# Patient Record
Sex: MALE | Race: WHITE
[De-identification: names, ages, dates, MRNs, and addresses within clinical notes are randomized per-mention and may not be internally consistent; named-entity substitution may affect disease eponyms.]

---

## 2019-11-26 ENCOUNTER — HOSPITAL ENCOUNTER (EMERGENCY)
Dept: HOSPITAL 97 - ER | Age: 79
Discharge: HOME | End: 2019-11-26
Payer: COMMERCIAL

## 2019-11-26 VITALS — OXYGEN SATURATION: 100 % | TEMPERATURE: 98 F

## 2019-11-26 VITALS — SYSTOLIC BLOOD PRESSURE: 128 MMHG | DIASTOLIC BLOOD PRESSURE: 63 MMHG
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
## 2019-11-26 DIAGNOSIS — Z23: ICD-10-CM

## 2019-11-26 DIAGNOSIS — S61.411A: Primary | ICD-10-CM
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
## 2019-11-26 DIAGNOSIS — I10: ICD-10-CM

## 2019-11-26 PROCEDURE — 0JQJ0ZZ REPAIR RIGHT HAND SUBCUTANEOUS TISSUE AND FASCIA, OPEN APPROACH: ICD-10-PCS

## 2019-11-26 PROCEDURE — 90471 IMMUNIZATION ADMIN: CPT

## 2019-11-26 PROCEDURE — 99283 EMERGENCY DEPT VISIT LOW MDM: CPT

## 2019-11-26 PROCEDURE — 90714 TD VACC NO PRESV 7 YRS+ IM: CPT

## 2019-11-26 NOTE — ER
Nurse's Notes                                                                                     

 [facility]                                                                 

Name: True Escobar                                                                                  

Age: 79 yrs                                                                                       

Sex: Male                                                                                         

: 1940                                                                                   

MRN: [id number]                                                                                   

Arrival Date: 2019                                                                          

Time: 17:40                                                                                       

Account#: [id number]                                                                            

Bed 15                                                                                            

Private MD: Blayne Hurtado V                                                                      

Diagnosis: Laceration without foreign body of right hand                                          

                                                                                                  

Presentation:                                                                                     

                                                                                             

17:42 Presenting complaint: Patient states: I tripped and fell and cut the base of my right   la1 

      pinky on the ground, also hurt the tip of my left pinky. Denies LOC. Transition of          

      care: patient was not received from another setting of care. Onset of symptoms was          

      2019. Risk Assessment: Do you want to hurt yourself or someone else?           

      Patient reports no desire to harm self or others. Initial Sepsis Screen: Does the           

      patient meet any 2 criteria? No. Patient's initial sepsis screen is negative. Does the      

      patient have a suspected source of infection? No. Patient's initial sepsis screen is        

      negative. Care prior to arrival: None.                                                      

17:42 Method Of Arrival: Ambulatory                                                           la1 

17:42 Acuity: MAGALY 4                                                                           la1 

                                                                                                  

Triage Assessment:                                                                                

17:50 Pain: Denies pain.                                                                      rb1 

                                                                                                  

Historical:                                                                                       

- Allergies:                                                                                      

17:44 No Known Allergies;                                                                     la1 

- PMHx:                                                                                           

17:44 Hyperlipidemia; Hypertension; Myocardial infarction;                                    la1 

                                                                                                  

- Immunization history:: Adult Immunizations up to date.                                          

- Social history:: Smoking status: Patient/guardian denies using tobacco.                         

- Ebola Screening: : No symptoms or risks identified at this time.                                

                                                                                                  

                                                                                                  

Screenin:50 Abuse screen: Denies threats or abuse. Nutritional screening: No deficits noted.        rb1 

      Tuberculosis screening: No symptoms or risk factors identified. Fall Risk Fall in past      

      12 months (25 points). No secondary diagnosis (0 pts). No IV (0 pts). Ambulatory Aid-       

      None/Bed Rest/Nurse Assist (0 pts). Gait- Normal/Bed Rest/Wheelchair (0 pts) Mental         

      Status- Oriented to own ability (0 pts). Total Blackburn Fall Scale indicates Low Risk          

      Score (25-44 pts). Fall prevention measures have been instituted. Side Rails Up X 2         

      Placed close to Nursing Station 1:1 attendant Assigned to Pt. Frequent Obs/Assesments       

      occuring Family Present and informed to notify staff if they need to leave bedside As       

      available Patient and Family Educated on Fall Prevention Program and strategies.            

                                                                                                  

Assessment:                                                                                       

17:50 General: Appears in no apparent distress. comfortable, Behavior is calm, cooperative.   rb1 

      Neuro: Level of Consciousness is awake, alert, obeys commands, Oriented to person,          

      place, time, situation. Cardiovascular: Capillary refill < 3 seconds is brisk in            

      bilateral fingers. Respiratory: Airway is patent Respiratory effort is even, unlabored,     

      Respiratory pattern is regular, symmetrical. GI: No signs and/or symptoms were reported     

      involving the gastrointestinal system. : No signs and/or symptoms were reported           

      regarding the genitourinary system. Derm: Skin is pink, warm \T\ dry. Injury Description:   

      Laceration sustained to outer aspect of right palm is contaminated, not bleeding, no        

      active bleeding noted at this time.                                                         

18:24 Reassessment: Discharge pending, waiting for suture placement by Dr. Al.          rb1 

18:28 Reassessment: Patient appears in no apparent distress at this time. No changes from     rb1 

      previously documented assessment.                                                           

                                                                                                  

Vital Signs:                                                                                      

17:44 Resp 16; Temp 98.0; Pulse Ox 100% on R/A; Weight 90.26 kg; Height 5 ft. 8 in. (172.72   la1 

      cm);                                                                                        

17:45  / 61; Pulse 64;                                                                  la1 

18:43  / 63; Pulse 66; Resp 17; Pulse Ox 100% on R/A; Pain 0/10;                        rb1 

17:44 Body Mass Index 30.26 (90.26 kg, 172.72 cm)                                             la1 

                                                                                                  

ED Course:                                                                                        

17:40 Patient arrived in ED.                                                                  mr  

17:41 Blayne Hurtado MD is Private Physician.                                                 mr  

17:43 Triage completed.                                                                       la1 

17:44 Arm band placed on right wrist.                                                         la1 

17:49 Pilar Prieto, RN is Primary Nurse.                                                   rb1 

17:50 Patient has correct armband on for positive identification. Bed in low position. Call   rb1 

      light in reach. Side rails up X 1. Pulse ox on. NIBP on.                                    

17:55 Ford Al MD is Attending Physician.                                             isaiah 

18:16 Blayne Hurtado MD is Referral Physician.                                                isaiah 

19:10 No provider procedures requiring assistance completed. Patient did not have IV access   rb1 

      during this emergency room visit.                                                           

                                                                                                  

Administered Medications:                                                                         

18:30 Drug: Lidocaine-Epinephrine -1%: (1:100,000) 5 ml Volume: 20 ml; Route: Infiltration;   rb1 

18:59 Drug: Tetanus-Diphtheria Toxoid Adult 0.5 ml {: [employer] Exp:         rb1 

      2021. Lot #: [id number]. } Route: IM; Site: right deltoid;                                 

19:08 Follow up: Response: No adverse reaction                                                rb1 

18:59 Drug: KeFLEX 500 mg Route: PO;                                                          rb1 

19:08 Follow up: Response: Medication administered at discharge.                              rb1 

                                                                                                  

                                                                                                  

Outcome:                                                                                          

18:18 Discharge ordered by MD.                                                                isaiah 

19:10 Discharged to home ambulatory, with family.                                             rb1 

19:10 Condition: stable                                                                           

19:10 Discharge instructions given to patient, Instructed on discharge instructions, follow       

      up and referral plans. medication usage, Demonstrated understanding of instructions,        

      follow-up care, medications, Prescriptions given X 2.                                       

19:10 Patient left the ED.                                                                    rb1 

                                                                                                  

Signatures:                                                                                       

Ford Al MD MD cha Rivera, Mary mr Attema, Dell, RN                         RN   la1                                                  

Pilar Prieto, RN                     RN   rb1                                                  

                                                                                                  

**************************************************************************************************

## 2019-11-26 NOTE — EDPHYS
Physician Documentation                                                                           

 [facility]                                                                 

Name: True Escobar                                                                                  

Age: 79 yrs                                                                                       

Sex: Male                                                                                         

: 1940                                                                                   

MRN: [id number]                                                                                   

Arrival Date: 2019                                                                          

Time: 17:40                                                                                       

Account#: [id number]                                                                            

Bed 15                                                                                            

Private MD: Blayne Hurtado V                                                                      

ED Physician Ford Al                                                                      

HPI:                                                                                              

                                                                                             

18:12 This 79 yrs old  Male presents to ER via Ambulatory with complaints of Fall    isaiah 

      Injury, Laceration To Hand.                                                                 

18:12 Details of fall: The patient fell from an upright position, while walking. Onset: The   isaiah 

      symptoms/episode began/occurred just prior to arrival. Associated injuries: The patient     

      sustained outer aspect of right palm, laceration, 2.5 cm(s). Severity of symptoms: At       

      their worst the symptoms were mild, in the emergency department the symptoms are            

      unchanged. The patient has not experienced similar symptoms in the past.                    

                                                                                                  

Historical:                                                                                       

- Allergies:                                                                                      

17:44 No Known Allergies;                                                                     la1 

- PMHx:                                                                                           

17:44 Hyperlipidemia; Hypertension; Myocardial infarction;                                    la1 

                                                                                                  

- Immunization history:: Adult Immunizations up to date.                                          

- Social history:: Smoking status: Patient/guardian denies using tobacco.                         

- Ebola Screening: : No symptoms or risks identified at this time.                                

                                                                                                  

                                                                                                  

ROS:                                                                                              

18:13 Constitutional: Negative for fever, chills, and weight loss, Eyes: Negative for injury, isaiah 

      pain, redness, and discharge, ENT: Negative for injury, pain, and discharge, Neck:          

      Negative for injury, pain, and swelling, Cardiovascular: Negative for chest pain,           

      palpitations, and edema, Respiratory: Negative for shortness of breath, cough,              

      wheezing, and pleuritic chest pain, Abdomen/GI: Negative for abdominal pain, nausea,        

      vomiting, diarrhea, and constipation, Back: Negative for injury and pain, : Negative      

      for injury, bleeding, discharge, and swelling, Skin: Negative for injury, rash, and         

      discoloration, Neuro: Negative for headache, weakness, numbness, tingling, and seizure,     

      Psych: Negative for depression, anxiety, suicide ideation, homicidal ideation, and          

      hallucinations, Allergy/Immunology: Negative for hives, rash, and allergies, Endocrine:     

      Negative for neck swelling, polydipsia, polyuria, polyphagia, and marked weight             

      changes, Hematologic/Lymphatic: Negative for swollen nodes, abnormal bleeding, and          

      unusual bruising.                                                                           

18:13 MS/extremity: Positive for decreased range of motion, laceration, pain, of the outer        

      aspect of right palm.                                                                       

                                                                                                  

Exam:                                                                                             

18:13 Constitutional:  This is a well developed, well nourished patient who is awake, alert,  isaiah 

      and in no acute distress. Head/Face:  Normocephalic, atraumatic. Eyes:  Pupils equal        

      round and reactive to light, extra-ocular motions intact.  Lids and lashes normal.          

      Conjunctiva and sclera are non-icteric and not injected.  Cornea within normal limits.      

      Periorbital areas with no swelling, redness, or edema. ENT:  Nares patent. No nasal         

      discharge, no septal abnormalities noted.  Tympanic membranes are normal and external       

      auditory canals are clear.  Oropharynx with no redness, swelling, or masses, exudates,      

      or evidence of obstruction, uvula midline.  Mucous membranes moist. Neck:  Trachea          

      midline, no thyromegaly or masses palpated, and no cervical lymphadenopathy.  Supple,       

      full range of motion without nuchal rigidity, or vertebral point tenderness.  No            

      Meningismus. Chest/axilla:  Normal chest wall appearance and motion.  Nontender with no     

      deformity.  No lesions are appreciated. Cardiovascular:  Regular rate and rhythm with a     

      normal S1 and S2.  No gallops, murmurs, or rubs.  Normal PMI, no JVD.  No pulse             

      deficits. Respiratory:  Lungs have equal breath sounds bilaterally, clear to                

      auscultation and percussion.  No rales, rhonchi or wheezes noted.  No increased work of     

      breathing, no retractions or nasal flaring. Abdomen/GI:  Soft, non-tender, with normal      

      bowel sounds.  No distension or tympany.  No guarding or rebound.  No evidence of           

      tenderness throughout. Back:  No spinal tenderness.  No costovertebral tenderness.          

      Full range of motion. Skin:  Warm, dry with normal turgor.  Normal color with no            

      rashes, no lesions, and no evidence of cellulitis. Neuro:  Awake and alert, GCS 15,         

      oriented to person, place, time, and situation.  Cranial nerves II-XII grossly intact.      

      Motor strength 5/5 in all extremities.  Sensory grossly intact.  Cerebellar exam            

      normal.  Normal gait. Psych:  Awake, alert, with orientation to person, place and time.     

       Behavior, mood, and affect are within normal limits.                                       

18:13 Musculoskeletal/extremity: Extremities: laceration, pain.                                   

                                                                                                  

Vital Signs:                                                                                      

17:44 Resp 16; Temp 98.0; Pulse Ox 100% on R/A; Weight 90.26 kg; Height 5 ft. 8 in. (172.72   la1 

      cm);                                                                                        

17:45  / 61; Pulse 64;                                                                  la1 

18:43  / 63; Pulse 66; Resp 17; Pulse Ox 100% on R/A; Pain 0/10;                        rb1 

17:44 Body Mass Index 30.26 (90.26 kg, 172.72 cm)                                             la1 

                                                                                                  

Laceration:                                                                                       

18:15 Wound Repair of 2.5cm ( 1.0in ) subcutaneous laceration to outer aspect of right palm.  isaiah 

      Irregularly shaped.. Skin/tissue flap noted.. Distal neuro/vascular/tendon intact.          

      Anesthesia: Local anesthetic administered with 5 mls of 1% lidocaine w/ Epi. Wound          

      prep: Moderate cleansing by me, Copious irrigation. Skin closed with 4 5-0 Prolene          

      using interrupted sutures and sterile technique. Dressed with Neosporin, non-adherent       

      dressing. Patient tolerated well.                                                           

                                                                                                  

MDM:                                                                                              

17:55 Patient medically screened.                                                             [facility] 

18:13 Data reviewed: vital signs, nurses notes.                                               [facility] 

                                                                                                  

                                                                                             

18:12 Order name: Prolene, Sutures; Complete Time: 18:45                                      [facility] 

                                                                                             

18:12 Order name: Dressing - Wound; Complete Time: 18:45                                      [facility] 

                                                                                             

18:12 Order name: Gloves, Sterile; Complete Time: 18:22                                       [facility] 

                                                                                             

18:12 Order name: Setup Suture Tray; Complete Time: 18:22                                     [facility] 

                                                                                                  

Administered Medications:                                                                         

18:30 Drug: Lidocaine-Epinephrine -1%: (1:100,000) 5 ml Volume: 20 ml; Route: Infiltration;   rb1 

18:59 Drug: Tetanus-Diphtheria Toxoid Adult 0.5 ml {: [employer]. Exp:         rb1 

      2021. Lot #: [id number]. } Route: IM; Site: right deltoid;                                 

19:08 Follow up: Response: No adverse reaction                                                rb1 

18:59 Drug: KeFLEX 500 mg Route: PO;                                                          rb1 

19:08 Follow up: Response: Medication administered at discharge.                              rb1 

                                                                                                  

                                                                                                  

Disposition:                                                                                      

19 18:18 Discharged to Home. Impression: Laceration without foreign body of right hand.     

- Condition is Stable.                                                                            

- Discharge Instructions: Laceration Care, Adult, Laceration Care, Adult, Easy-to-Read.           

- Prescriptions for Keflex 500 mg Oral Capsule - take 1 capsule by ORAL route every 6             

  hours for 10 days; 40 capsule. Tylenol- Codeine #3 300-30 mg Oral Tablet - take 2               

  tablets by ORAL route every 6 hours As needed; 20 tablet.                                       

- Medication Reconciliation Form, Thank You Letter, Antibiotic Education, Prescription            

  Opioid Use form.                                                                                

- Follow up: Blayne Hurtdao MD; When: 7 - 10 days; Reason: Recheck today's complaints,            

  Continuance of care, Re-evaluation by your physician.                                           

- Problem is new.                                                                                 

- Symptoms have improved.                                                                         

                                                                                                  

                                                                                                  

                                                                                                  

Signatures:                                                                                       

Dispatcher [employer]                           EDMS                                                 

Ford Al MD MD cha Attema, Lee RN                         RN   la1                                                  

Pilar Prieto, RN                     RN   rb1                                                  

                                                                                                  

Corrections: (The following items were deleted from the chart)                                    

18:46 18:19 Hand Right 3 View+RAD.RAD.BRZ ordered. [facility]

19:10 18:18 2019 18:18 Discharged to Home. Impression: Laceration without foreign body  rb1 

      of right hand. Condition is Stable. Forms are Medication Reconciliation Form, Thank You     

      Letter, Antibiotic Education, Prescription Opioid Use. Follow up: Blayne Hurtado; When: 7     

      - 10 days; Reason: Recheck today's complaints, Continuance of care, Re-evaluation by        

      your physician. Problem is new. Symptoms have improved. isaiah                                 

                                                                                                  

**************************************************************************************************

## 2023-02-09 ENCOUNTER — HOSPITAL ENCOUNTER (OUTPATIENT)
Dept: HOSPITAL 97 - 4TH | Age: 83
Setting detail: OBSERVATION
LOS: 2 days | Discharge: HOME | End: 2023-02-11
Attending: INTERNAL MEDICINE | Admitting: INTERNAL MEDICINE
Payer: COMMERCIAL

## 2023-02-09 VITALS — BODY MASS INDEX: 31.6 KG/M2

## 2023-02-09 DIAGNOSIS — G30.9: ICD-10-CM

## 2023-02-09 DIAGNOSIS — F02.80: ICD-10-CM

## 2023-02-09 DIAGNOSIS — I25.10: ICD-10-CM

## 2023-02-09 DIAGNOSIS — E11.9: ICD-10-CM

## 2023-02-09 DIAGNOSIS — I50.9: ICD-10-CM

## 2023-02-09 DIAGNOSIS — Z95.5: ICD-10-CM

## 2023-02-09 DIAGNOSIS — R42: ICD-10-CM

## 2023-02-09 DIAGNOSIS — R27.0: Primary | ICD-10-CM

## 2023-02-09 DIAGNOSIS — U07.1: ICD-10-CM

## 2023-02-09 DIAGNOSIS — E86.0: ICD-10-CM

## 2023-02-09 DIAGNOSIS — G43.909: ICD-10-CM

## 2023-02-09 DIAGNOSIS — G31.9: ICD-10-CM

## 2023-02-09 DIAGNOSIS — I10: ICD-10-CM

## 2023-02-09 LAB
ALBUMIN SERPL BCP-MCNC: 4.2 G/DL (ref 3.4–5)
ALP SERPL-CCNC: 79 U/L (ref 45–117)
ALT SERPL W P-5'-P-CCNC: 100 U/L (ref 16–61)
AST SERPL W P-5'-P-CCNC: 95 U/L (ref 15–37)
BUN BLD-MCNC: 23 MG/DL (ref 7–18)
CREAT UR-SCNC: 226 MG/DL (ref 20–370)
GLUCOSE SERPLBLD-MCNC: 118 MG/DL (ref 74–106)
HCT VFR BLD CALC: 50.9 % (ref 39.6–49)
INR BLD: 1.19
LYMPHOCYTES # SPEC AUTO: 0.5 K/UL (ref 0.7–4.9)
MAGNESIUM SERPL-MCNC: 2.4 MG/DL (ref 1.6–2.4)
MCV RBC: 88.9 FL (ref 80–100)
MICROALBUMIN UR-MCNC: 63 MG/DL (ref ?–1.9)
PMV BLD: 8 FL (ref 7.6–11.3)
POTASSIUM SERPL-SCNC: 4 MMOL/L (ref 3.5–5.1)
RBC # BLD: 5.72 M/UL (ref 4.33–5.43)
TSH SERPL DL<=0.05 MIU/L-ACNC: 0.92 UIU/ML (ref 0.36–3.74)

## 2023-02-09 PROCEDURE — 97161 PT EVAL LOW COMPLEX 20 MIN: CPT

## 2023-02-09 PROCEDURE — 82043 UR ALBUMIN QUANTITATIVE: CPT

## 2023-02-09 PROCEDURE — 84443 ASSAY THYROID STIM HORMONE: CPT

## 2023-02-09 PROCEDURE — 80048 BASIC METABOLIC PNL TOTAL CA: CPT

## 2023-02-09 PROCEDURE — 85730 THROMBOPLASTIN TIME PARTIAL: CPT

## 2023-02-09 PROCEDURE — 93005 ELECTROCARDIOGRAM TRACING: CPT

## 2023-02-09 PROCEDURE — 70551 MRI BRAIN STEM W/O DYE: CPT

## 2023-02-09 PROCEDURE — 80076 HEPATIC FUNCTION PANEL: CPT

## 2023-02-09 PROCEDURE — 81001 URINALYSIS AUTO W/SCOPE: CPT

## 2023-02-09 PROCEDURE — 71046 X-RAY EXAM CHEST 2 VIEWS: CPT

## 2023-02-09 PROCEDURE — 82306 VITAMIN D 25 HYDROXY: CPT

## 2023-02-09 PROCEDURE — 97116 GAIT TRAINING THERAPY: CPT

## 2023-02-09 PROCEDURE — 85610 PROTHROMBIN TIME: CPT

## 2023-02-09 PROCEDURE — 83036 HEMOGLOBIN GLYCOSYLATED A1C: CPT

## 2023-02-09 PROCEDURE — 94640 AIRWAY INHALATION TREATMENT: CPT

## 2023-02-09 PROCEDURE — 36415 COLL VENOUS BLD VENIPUNCTURE: CPT

## 2023-02-09 PROCEDURE — 82947 ASSAY GLUCOSE BLOOD QUANT: CPT

## 2023-02-09 PROCEDURE — 84100 ASSAY OF PHOSPHORUS: CPT

## 2023-02-09 PROCEDURE — 87811 SARS-COV-2 COVID19 W/OPTIC: CPT

## 2023-02-09 PROCEDURE — 82607 VITAMIN B-12: CPT

## 2023-02-09 PROCEDURE — 97530 THERAPEUTIC ACTIVITIES: CPT

## 2023-02-09 PROCEDURE — 83735 ASSAY OF MAGNESIUM: CPT

## 2023-02-09 PROCEDURE — 82570 ASSAY OF URINE CREATININE: CPT

## 2023-02-09 PROCEDURE — 85025 COMPLETE CBC W/AUTO DIFF WBC: CPT

## 2023-02-09 RX ADMIN — ISOSORBIDE MONONITRATE SCH MG: 30 TABLET, EXTENDED RELEASE ORAL at 20:27

## 2023-02-09 RX ADMIN — NIRMATRELVIR AND RITONAVIR SCH: KIT at 14:21

## 2023-02-09 RX ADMIN — SODIUM CHLORIDE SCH MLS: 0.45 INJECTION, SOLUTION INTRAVENOUS at 14:14

## 2023-02-09 RX ADMIN — ISOSORBIDE MONONITRATE SCH MG: 30 TABLET, EXTENDED RELEASE ORAL at 14:14

## 2023-02-09 RX ADMIN — Medication SCH: at 16:57

## 2023-02-09 RX ADMIN — IPRATROPIUM BROMIDE SCH MG: 0.5 SOLUTION RESPIRATORY (INHALATION) at 15:53

## 2023-02-09 RX ADMIN — HUMAN INSULIN SCH: 100 INJECTION, SOLUTION SUBCUTANEOUS at 16:30

## 2023-02-09 RX ADMIN — ALBUTEROL SULFATE SCH MG: 2.5 SOLUTION RESPIRATORY (INHALATION) at 20:15

## 2023-02-09 RX ADMIN — ALBUTEROL SULFATE SCH MG: 2.5 SOLUTION RESPIRATORY (INHALATION) at 15:53

## 2023-02-09 RX ADMIN — HUMAN INSULIN SCH: 100 INJECTION, SOLUTION SUBCUTANEOUS at 20:28

## 2023-02-09 RX ADMIN — NIRMATRELVIR AND RITONAVIR SCH: KIT at 20:27

## 2023-02-09 RX ADMIN — IPRATROPIUM BROMIDE SCH MG: 0.5 SOLUTION RESPIRATORY (INHALATION) at 20:15

## 2023-02-09 NOTE — RAD REPORT
EXAM DESCRIPTION:  MRI - Brain Wo Cont - 2/9/2023 6:04 pm

 

CLINICAL HISTORY:  possible NPH

Headache, drowsiness

 

COMPARISON:  HEAD BRAIN W O CONTRAST dated 3/23/2016

 

TECHNIQUE:  Multi-sequence, multiplanar MR imaging of the brain was performed without contrast.

 

FINDINGS:  No intracranial hemorrhage, hydrocephalus or extra-axial fluid collections.Moderate genera
lized brain atrophy. Mild to moderate T2 and FLAIR hyperintensity in the periventricular and deep whi
te matter likely represents chronic microvascular ischemic changes. No edema or shift of midline stru
ctures. No findings to suspect brain mass. DWI is negative for acute CVA.

 

Midline structures are normally formed.

 

Mastoid air cells and paranasal sinuses are clear. Calcified right globe.

 

IMPRESSION:  Negative for acute CVA or other acute intracranial process.

 

Ventricular prominence is in proportion to the degree of global brain atrophy.

## 2023-02-09 NOTE — RAD REPORT
EXAM DESCRIPTION:  RAD - Chest Pa And Lat (2 Views) - 2/9/2023 5:37 pm

 

CLINICAL HISTORY:  covid positive

Chest pain.

 

COMPARISON:  CHEST SINGLE VIEW dated 3/23/2016; CHEST PA AND LAT 2 VIEW dated 12/27/2012; CHEST SINGL
E VIEW dated 3/2/2011; CHEST PA AND LAT 2 VIEW dated 9/1/2010

 

FINDINGS:  Mild interstitial pulmonary edema suspected. The heart is mildly enlarged in size. No disp
laced fractures.

 

IMPRESSION:  Mild CHF.

## 2023-02-10 RX ADMIN — HUMAN INSULIN SCH: 100 INJECTION, SOLUTION SUBCUTANEOUS at 20:25

## 2023-02-10 RX ADMIN — MEMANTINE HYDROCHLORIDE SCH MG: 10 TABLET ORAL at 20:17

## 2023-02-10 RX ADMIN — IPRATROPIUM BROMIDE SCH: 0.5 SOLUTION RESPIRATORY (INHALATION) at 14:00

## 2023-02-10 RX ADMIN — ISOSORBIDE MONONITRATE SCH MG: 30 TABLET, EXTENDED RELEASE ORAL at 20:17

## 2023-02-10 RX ADMIN — ISOSORBIDE MONONITRATE SCH MG: 30 TABLET, EXTENDED RELEASE ORAL at 07:49

## 2023-02-10 RX ADMIN — NIRMATRELVIR AND RITONAVIR SCH: KIT at 20:20

## 2023-02-10 RX ADMIN — ASPIRIN 81 MG SCH MG: 81 TABLET ORAL at 07:49

## 2023-02-10 RX ADMIN — IPRATROPIUM BROMIDE SCH MG: 0.5 SOLUTION RESPIRATORY (INHALATION) at 19:35

## 2023-02-10 RX ADMIN — HUMAN INSULIN SCH: 100 INJECTION, SOLUTION SUBCUTANEOUS at 16:30

## 2023-02-10 RX ADMIN — Medication SCH ML: at 07:53

## 2023-02-10 RX ADMIN — Medication SCH: at 01:00

## 2023-02-10 RX ADMIN — HUMAN INSULIN SCH: 100 INJECTION, SOLUTION SUBCUTANEOUS at 11:30

## 2023-02-10 RX ADMIN — ASPIRIN 81 MG SCH MG: 81 TABLET ORAL at 20:17

## 2023-02-10 RX ADMIN — Medication SCH: at 16:45

## 2023-02-10 RX ADMIN — ALBUTEROL SULFATE SCH MG: 2.5 SOLUTION RESPIRATORY (INHALATION) at 01:30

## 2023-02-10 RX ADMIN — HUMAN INSULIN SCH: 100 INJECTION, SOLUTION SUBCUTANEOUS at 07:30

## 2023-02-10 RX ADMIN — IPRATROPIUM BROMIDE SCH MG: 0.5 SOLUTION RESPIRATORY (INHALATION) at 08:38

## 2023-02-10 RX ADMIN — ALBUTEROL SULFATE SCH: 2.5 SOLUTION RESPIRATORY (INHALATION) at 14:00

## 2023-02-10 RX ADMIN — ALBUTEROL SULFATE SCH MG: 2.5 SOLUTION RESPIRATORY (INHALATION) at 19:35

## 2023-02-10 RX ADMIN — CLOPIDOGREL BISULFATE SCH MG: 75 TABLET, FILM COATED ORAL at 07:50

## 2023-02-10 RX ADMIN — NIRMATRELVIR AND RITONAVIR SCH: KIT at 10:08

## 2023-02-10 RX ADMIN — ALBUTEROL SULFATE SCH MG: 2.5 SOLUTION RESPIRATORY (INHALATION) at 08:38

## 2023-02-10 RX ADMIN — MEMANTINE HYDROCHLORIDE SCH MG: 10 TABLET ORAL at 07:50

## 2023-02-10 RX ADMIN — IPRATROPIUM BROMIDE SCH MG: 0.5 SOLUTION RESPIRATORY (INHALATION) at 01:30

## 2023-02-10 RX ADMIN — SODIUM CHLORIDE SCH: 0.45 INJECTION, SOLUTION INTRAVENOUS at 23:20

## 2023-02-10 NOTE — CON
Reason For Consultation:  Consultation called because of the possibility of NPH.



History Of Present Illness:  Mr. Escobar is an 82-year-old, right-handed  patient who was seen
 in clinic for Alzheimer disease.  He has taken the Exelon patch and memantine for 1 year and has mil
d cognitive impairment.  Shannon cognitive Assessment as totaled a year ago was 24/30.  His most rec
ent clock draw test was 2.5/4 and was in October 17, 2022.  The patient notes he started feeling dizz
y.  He said the world seemed to be swirling about him.  Could not keep his balance, started to feel u
nsteady.  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
e hydrocephalus.  He was admitted to [facility] for workup.  While at [facility], h
is workup revealed he was positive for the COVID-19 infection, and brain MRI identified mild-to-moder
ate global atrophy with ventricular size increase in proportion.  There was no evidence of hydrocepha
rony, acute stroke, hemorrhage, or mass or other abnormalities.  His blood work otherwise showed a nor
mal white blood cell count with lymphocytes low at 8.2, hemoglobin 17, INR 1.19.  Chemistries show el
evated creatinine of 1.37, glucose 118.  A1c 6.2.  Liver function studies elevated, AST 95, , 
alkaline phosphatase normal at 79.  Vitamin D level is low at 21.7.  Urinalysis shows 63 random urine
 microalbumin with the microalbumin to creatinine ratio of 278.8 which is elevated.  Urine protein is
 elevated at 2.0.  His COVID-19 test is positive.  His chest x-ray shows mild interstitial pulmonary 
edema with mild CHF.  While hospitalized, the patient has had a T-max of 100.2.  He has received intr
avenous hydration.  He is put back on Crestor for dyslipidemia, has memantine for Alzheimer disease, 
Plavix 75 mg and aspirin, he did receive 162 mg.  he is receiving nebulizer treatment.  He has not ye
t started on Paxlovid.



Past Medical History:  Migraines, hypertension, diabetes mellitus, chronic heart disease, coronary ar
natalia disease.



Past Surgical History:  He has had 5 cardiac stents placed by Dr. Ranjit Houser, at [facility].



Family History:  Both parents have Alzheimer disease.  Sister also has Alzheimer disease.  Son had a 
myocardial infarction.



Social History:  Patient is .  Taking care of his sick wife.  No alcohol, tobacco, or IV drug 
use.



Medications:  Carvedilol 6.25 mg in the morning, Exelon patch 13.3 mg every 24 hours, rosuvastatin 20
 mg daily, isosorbide extended release 30 mg morning.  Niaspan 500 mg 2 in the morning, 2 in the even
ing.  Aspirin 81 mg tablets 2 in the morning and 2 in the evening.  Plavix 75 mg daily.  Curcumin 400
 mg at noon.  Mitochondrial energy booster 1 in the morning, 1 in the evening.



Allergies:  GABAPENTIN AND TRAMADOL.



Physical Examination:

Vital Signs:  Blood pressure 160/63, pulse 75, respiratory rate 16, T-max is noted 100.2, current tem
perature 98.6, oxygen saturation 92% on 2 L of oxygen via nasal cannula. 

General:  Mr. Escobar is lying in his bed.  He is in no significant distress. 

HEENT:  He appears normocephalic, atraumatic.  Sclerae anicteric.  Oropharynx is moist and pink. 

Neck:  Supple. 

Chest:  Clear. 

Heart:  Regular. 

Extremities:  Show no edema or cyanosis. 

Neurological:  He is blind in the right eye.  Otherwise, his cranial nerves are intact.  His motor ex
amination intact in upper and lower extremities 5/5 proximally and distally.  Sensory exam, stocking-
glove loss, light touch temperature in legs and arms.  Reflexes, symmetric and depressed.  Coordinati
on is slow, but intact.  Gait is wide-based and slightly unsteady as he did insist and ambulate.



Assessment:  Mr. Escobar is an 82-year-old patient who has COVID-19 infection probably the cause of deh
ydration, dizziness.  He has low-grade fever.  This is on background of Alzheimer disease with ______
____ mild-to-moderate impairment.  He does have diabetes, hypertension, and other comorbidities as ma
naged by Dr. Hurtado.



Plan:  

1.Aggressive management of his COVID-19 infection.  Recommend Paxlovid.

2.Continue Exelon patch 13.3 mg every 24 hours.

3.Continue memantine 10 mg twice daily.

4.Continue management of hypertension and diabetes mellitus, dyslipidemia as noted.

5.DVT prophylaxis, Lovenox 30 mg subcutaneously daily.





LOGAN/LUZMARIA

DD:  02/10/2023 11:09:31Voice ID:  [id number]

DT:  02/10/2023 15:11:11Report ID:  [id number]

## 2023-02-10 NOTE — P.DS
Admission Date: 02/09/23


Discharge Date: 02/10/23


Disposition: ROUTINE DISCHARGE


Discharge Condition: FAIR


Hospital Course: 





True came in office,  was staggering with wide gait.  He has dementia. I 

suspected NPH but actually he is covid positive.  He had no covid related 

symptoms.   He was given paxlovid and started on oxygen.  HE is doing great and 

able to go home with oxygen. I called in rx for him.  


Vital Signs/Physical Exam: 














Temp Pulse Resp BP Pulse Ox


 


 98.9 F   74   16   115/66   90 L


 


 02/10/23 12:00  02/10/23 12:00  02/10/23 12:00  02/10/23 12:00  02/10/23 12:00








Laboratory Data at Discharge: 














WBC  6.40 K/uL (4.3-10.9)   02/09/23  11:08    


 


Hgb  17.0 g/dL (13.6-17.9)   02/09/23  11:08    


 


Hct  50.9 % (39.6-49.0)  H  02/09/23  11:08    


 


Plt Count  174 K/uL (152-406)   02/09/23  11:08    


 


PT  13.1 SECONDS (9.5-12.5)  H  02/09/23  11:08    


 


INR  1.19   02/09/23  11:08    


 


APTT  32.2 SECONDS (24.3-36.9)   02/09/23  11:08    


 


Sodium  137 mmol/L (136-145)   02/09/23  11:08    


 


Potassium  4.0 mmol/L (3.5-5.1)   02/09/23  11:08    


 


BUN  23 mg/dL (7-18)  H  02/09/23  11:08    


 


Creatinine  1.37 mg/dL (0.70-1.30)  H  02/09/23  11:08    


 


Glucose  118 mg/dL ()  H  02/09/23  11:08    


 


Phosphorus  3.1 mg/dL (2.5-4.9)   02/09/23  11:08    


 


Magnesium  2.4 mg/dL (1.6-2.4)   02/09/23  11:08    


 


Total Bilirubin  0.6 mg/dL (0.2-1.0)   02/09/23  11:08    


 


AST  95 U/L (15-37)  H  02/09/23  11:08    


 


ALT  100 U/L (16-61)  H  02/09/23  11:08    


 


Alkaline Phosphatase  79 U/L ()   02/09/23  11:08    








Home Medications: 








Aspirin 2 tab PO BID 02/09/23 


Clopidogrel Bisulfate [Plavix*] 75 mg PO DAILY 02/09/23 


Curcumin-Phosphatidylcholine [Curcumin Phytosome] 400 mg PO DAILY 02/09/23 


Isosorbide Mononitrate [Isosorbide Mononitrate ER] 1 tab PO BID 02/09/23 


Memantine HCl 1 tab PO BID 02/09/23 


Niacin (Inositol Niacinate) [Niacin 500 mg Capsule] 2 tab PO BID 02/09/23 


[place]-3S/Dha/Epa/Fish Oil [Omega-3 Fish Oil 1,200 mg Sfgl] 1 tab PO BID 02/09/23




Rosuvastatin Calcium 20 mg PO DAILY AT SUPPER 02/09/23 


carvediloL [Carvedilol] 6.25 mg PO BID 02/09/23 


Metformin HCl 0.5 tab PO BID 02/10/23

## 2023-02-11 VITALS — DIASTOLIC BLOOD PRESSURE: 71 MMHG | SYSTOLIC BLOOD PRESSURE: 118 MMHG | TEMPERATURE: 98.7 F

## 2023-02-11 VITALS — OXYGEN SATURATION: 97 %

## 2023-02-11 RX ADMIN — Medication SCH ML: at 07:57

## 2023-02-11 RX ADMIN — NIRMATRELVIR AND RITONAVIR SCH: KIT at 09:19

## 2023-02-11 RX ADMIN — HUMAN INSULIN SCH: 100 INJECTION, SOLUTION SUBCUTANEOUS at 11:30

## 2023-02-11 RX ADMIN — HUMAN INSULIN SCH: 100 INJECTION, SOLUTION SUBCUTANEOUS at 07:30

## 2023-02-11 RX ADMIN — Medication SCH ML: at 00:32

## 2023-02-11 RX ADMIN — CLOPIDOGREL BISULFATE SCH MG: 75 TABLET, FILM COATED ORAL at 07:56

## 2023-02-11 RX ADMIN — ASPIRIN 81 MG SCH MG: 81 TABLET ORAL at 07:55

## 2023-02-11 RX ADMIN — MEMANTINE HYDROCHLORIDE SCH MG: 10 TABLET ORAL at 07:56

## 2023-02-11 RX ADMIN — ISOSORBIDE MONONITRATE SCH MG: 30 TABLET, EXTENDED RELEASE ORAL at 07:56

## 2023-02-11 RX ADMIN — IPRATROPIUM BROMIDE SCH MG: 0.5 SOLUTION RESPIRATORY (INHALATION) at 02:00

## 2023-02-11 RX ADMIN — ALBUTEROL SULFATE SCH MG: 2.5 SOLUTION RESPIRATORY (INHALATION) at 02:00
